# Patient Record
Sex: FEMALE | URBAN - METROPOLITAN AREA
[De-identification: names, ages, dates, MRNs, and addresses within clinical notes are randomized per-mention and may not be internally consistent; named-entity substitution may affect disease eponyms.]

---

## 2023-07-17 ENCOUNTER — APPOINTMENT (OUTPATIENT)
Dept: LAB | Facility: LAB | Age: 23
End: 2023-07-17
Payer: COMMERCIAL

## 2024-06-05 ENCOUNTER — APPOINTMENT (OUTPATIENT)
Dept: PRIMARY CARE | Facility: CLINIC | Age: 24
End: 2024-06-05
Payer: COMMERCIAL

## 2024-09-06 ENCOUNTER — APPOINTMENT (OUTPATIENT)
Dept: PRIMARY CARE | Facility: CLINIC | Age: 24
End: 2024-09-06
Payer: COMMERCIAL

## 2025-05-19 ENCOUNTER — APPOINTMENT (OUTPATIENT)
Dept: PRIMARY CARE | Facility: CLINIC | Age: 25
End: 2025-05-19

## 2025-05-21 ENCOUNTER — APPOINTMENT (OUTPATIENT)
Dept: PRIMARY CARE | Facility: CLINIC | Age: 25
End: 2025-05-21
Payer: COMMERCIAL

## 2025-05-21 ENCOUNTER — NUTRITION (OUTPATIENT)
Dept: PRIMARY CARE | Facility: CLINIC | Age: 25
End: 2025-05-21
Payer: COMMERCIAL

## 2025-05-21 VITALS
HEIGHT: 67 IN | RESPIRATION RATE: 18 BRPM | WEIGHT: 230.2 LBS | TEMPERATURE: 97 F | HEART RATE: 84 BPM | SYSTOLIC BLOOD PRESSURE: 110 MMHG | OXYGEN SATURATION: 98 % | BODY MASS INDEX: 36.13 KG/M2 | DIASTOLIC BLOOD PRESSURE: 64 MMHG

## 2025-05-21 VITALS — BODY MASS INDEX: 36.05 KG/M2 | HEIGHT: 67 IN

## 2025-05-21 DIAGNOSIS — F32.0 CURRENT MILD EPISODE OF MAJOR DEPRESSIVE DISORDER WITHOUT PRIOR EPISODE: Primary | ICD-10-CM

## 2025-05-21 DIAGNOSIS — E66.9 OBESITY (BMI 35.0-39.9 WITHOUT COMORBIDITY): Primary | ICD-10-CM

## 2025-05-21 DIAGNOSIS — Z00.00 ANNUAL PHYSICAL EXAM: ICD-10-CM

## 2025-05-21 DIAGNOSIS — F41.9 ANXIETY AND DEPRESSION: ICD-10-CM

## 2025-05-21 DIAGNOSIS — E66.9 OBESITY (BMI 35.0-39.9 WITHOUT COMORBIDITY): ICD-10-CM

## 2025-05-21 DIAGNOSIS — F32.A ANXIETY AND DEPRESSION: ICD-10-CM

## 2025-05-21 DIAGNOSIS — F41.9 ANXIETY: ICD-10-CM

## 2025-05-21 PROBLEM — N89.8 VAGINAL ODOR: Status: ACTIVE | Noted: 2025-05-21

## 2025-05-21 PROBLEM — N92.1 BREAKTHROUGH BLEEDING: Status: ACTIVE | Noted: 2025-05-21

## 2025-05-21 PROCEDURE — 99385 PREV VISIT NEW AGE 18-39: CPT | Performed by: INTERNAL MEDICINE

## 2025-05-21 PROCEDURE — 99214 OFFICE O/P EST MOD 30 MIN: CPT | Performed by: INTERNAL MEDICINE

## 2025-05-21 PROCEDURE — 97802 MEDICAL NUTRITION INDIV IN: CPT | Performed by: DIETITIAN, REGISTERED

## 2025-05-21 PROCEDURE — 3008F BODY MASS INDEX DOCD: CPT | Performed by: INTERNAL MEDICINE

## 2025-05-21 ASSESSMENT — ENCOUNTER SYMPTOMS
DIARRHEA: 1
NAUSEA: 0
WHEEZING: 0
ADENOPATHY: 0
CHEST TIGHTNESS: 0
VOMITING: 0
DYSURIA: 0
SLEEP DISTURBANCE: 0
CONSTIPATION: 1
NERVOUS/ANXIOUS: 1
UNEXPECTED WEIGHT CHANGE: 0
SHORTNESS OF BREATH: 0
CONFUSION: 0
DIZZINESS: 0
PALPITATIONS: 0
ARTHRALGIAS: 0
SORE THROAT: 0
FATIGUE: 1
CHILLS: 0
WEAKNESS: 0
JOINT SWELLING: 0
ROS GI COMMENTS: SOMETIMES
ABDOMINAL PAIN: 0
COUGH: 0

## 2025-05-21 ASSESSMENT — PATIENT HEALTH QUESTIONNAIRE - PHQ9
4. FEELING TIRED OR HAVING LITTLE ENERGY: SEVERAL DAYS
5. POOR APPETITE OR OVEREATING: SEVERAL DAYS
2. FEELING DOWN, DEPRESSED OR HOPELESS: MORE THAN HALF THE DAYS
10. IF YOU CHECKED OFF ANY PROBLEMS, HOW DIFFICULT HAVE THESE PROBLEMS MADE IT FOR YOU TO DO YOUR WORK, TAKE CARE OF THINGS AT HOME, OR GET ALONG WITH OTHER PEOPLE: NOT DIFFICULT AT ALL
8. MOVING OR SPEAKING SO SLOWLY THAT OTHER PEOPLE COULD HAVE NOTICED. OR THE OPPOSITE, BEING SO FIGETY OR RESTLESS THAT YOU HAVE BEEN MOVING AROUND A LOT MORE THAN USUAL: NOT AT ALL
7. TROUBLE CONCENTRATING ON THINGS, SUCH AS READING THE NEWSPAPER OR WATCHING TELEVISION: NOT AT ALL
6. FEELING BAD ABOUT YOURSELF - OR THAT YOU ARE A FAILURE OR HAVE LET YOURSELF OR YOUR FAMILY DOWN: MORE THAN HALF THE DAYS
SUM OF ALL RESPONSES TO PHQ QUESTIONS 1-9: 9
1. LITTLE INTEREST OR PLEASURE IN DOING THINGS: MORE THAN HALF THE DAYS
3. TROUBLE FALLING OR STAYING ASLEEP OR SLEEPING TOO MUCH: SEVERAL DAYS
SUM OF ALL RESPONSES TO PHQ9 QUESTIONS 1 AND 2: 4
9. THOUGHTS THAT YOU WOULD BE BETTER OFF DEAD, OR OF HURTING YOURSELF: NOT AT ALL

## 2025-05-21 NOTE — PROGRESS NOTES
Subjective   Brianne Merida is a 24 y.o. female who presents for Establish Care (NP -establish care ) and Annual Exam (CPE).  NP- establish care, found dr Dumont name  under  her  insurance plan, states she  did se a CPE - 3-4 years ago   CPE  Patient would like  to talk regarding weight  loss [place],did try diet, exercising ,did  follow up  with  weight  watchers  program - did not help  losing weight   PHQ2/9= 9 , working night shift, mostly have anxiety, worry about everything.      Review of Systems   Constitutional:  Positive for fatigue. Negative for chills and unexpected weight change.        Sometimes    HENT:  Negative for congestion, ear pain and sore throat.    Respiratory:  Negative for cough, chest tightness, shortness of breath and wheezing.    Cardiovascular:  Negative for palpitations and leg swelling.   Gastrointestinal:  Positive for constipation and diarrhea. Negative for abdominal pain, nausea and vomiting.        Sometimes    Genitourinary:  Negative for dysuria and urgency.   Musculoskeletal:  Negative for arthralgias and joint swelling.   Skin:  Negative for rash.   Neurological:  Negative for dizziness and weakness.   Hematological:  Negative for adenopathy.   Psychiatric/Behavioral:  Negative for confusion and sleep disturbance. The patient is nervous/anxious.    All other systems reviewed and are negative.      Objective   Physical Exam  Constitutional:       Appearance: Normal appearance.      Comments: obese   HENT:      Head: Normocephalic and atraumatic.   Eyes:      Conjunctiva/sclera: Conjunctivae normal.   Neck:      Vascular: No carotid bruit.   Cardiovascular:      Rate and Rhythm: Normal rate and regular rhythm.      Heart sounds: No murmur heard.  Pulmonary:      Effort: No respiratory distress.      Breath sounds: No wheezing, rhonchi or rales.   Chest:      Chest wall: No tenderness.   Abdominal:      General: Bowel sounds are normal. There is no distension.      Palpations: Abdomen  "is soft. There is no mass.      Tenderness: There is no abdominal tenderness.   Musculoskeletal:         General: Normal range of motion.      Cervical back: Neck supple.   Lymphadenopathy:      Cervical: No cervical adenopathy.   Skin:     Coloration: Skin is not jaundiced.      Findings: No rash.   Neurological:      General: No focal deficit present.      Mental Status: She is alert and oriented to person, place, and time. Mental status is at baseline.      Motor: No weakness.      Gait: Gait normal.   Psychiatric:         Mood and Affect: Mood normal.         Behavior: Behavior normal.         Judgment: Judgment normal.       /64 (BP Location: Left arm, Patient Position: Sitting)   Pulse 84   Temp 36.1 °C (97 °F)   Resp 18   Ht 1.702 m (5' 7\")   Wt 104 kg (230 lb 3.2 oz)   LMP 05/06/2025   SpO2 98%   BMI 36.05 kg/m²       Assessment/Plan   Problem List Items Addressed This Visit       Obesity (BMI 35.0-39.9 without comorbidity)    Relevant Orders    Referral to Nutrition Services    Insulin, Fasting    Anxiety    Current mild episode of major depressive disorder without prior episode - Primary    Relevant Orders    Cortisol    Vitamin D 25-Hydroxy,Total (for eval of Vitamin D levels)    Microscopic Only, Urine    Tsh With Reflex To Free T4 If Abnormal    Basic metabolic panel    CBC    Comprehensive metabolic panel    Folate    TSH    Vitamin B12 level    Follow Up In [facility]    Annual physical exam    Relevant Orders    Lipid Panel         "

## 2025-05-21 NOTE — PROGRESS NOTES
Nutrition Initial Assessment:     Patient Brianne Merida is a 24 y.o. female being seen at [facility] who was referred by Dr. Salazar Dumont on 5/21/25 for   1. Obesity (BMI 35.0-39.9 without comorbidity)  Referral to Nutrition Services        Nutrition Assessment    Problem List[1]    Nutrition History:  Food & Nutrition History: Patient is having this visit to discuss weight management. She has been vegetarian for 6-7 years. She primarily eats whole food sources of vegetarian protein instead of meat analogues. She said she has food noise and has experienced it for years, she has been watching her weight/diet since grade school, and said she was raised in a family that also had food noise. 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 She is interested in weight loss medications and potentially might start on one.  Food Allergies:  (none);  Food Intolerances: none  Sleep Habits:  (has disrupted sleep she attributes to working nights)    Diet Recall:  Meal 1: B: on a work day coffee w/ creamer without any food. On a non-work day coffee w/ cream and a yogurt bowl, or she skips breakfast and doesn't eat during the daytime hours (since this is normally when her body is sleeping)  Snack 1: 10 pm - cheese, crackers, fruit  Meal 2: 2-3 am she compiles healthy snacks together to make a combination lunch/dinner:  yogurt w/ fruit and mitra seeds, and a Greek salad / cowboy caviar and chips. Sometimes an energy drink. On non-workdays, she eats a Mexican bowl for dinner (not at 2-3 am)  Snack 2: B: when her shift is finished she eats around 9 am - 2 eggs and toast  Fluid Intake: coffee in the morning, 2 Liquid IV/day, sometimes an energy drink. Diet Pepsi occasionally. Social EtOH.  Energy Intake: Good > 75 % (carbs can make her beaver but they also make her feel tired)    Food Preparation:  Cooking: Patient (feels best when meal prepping)  Grocery Shopping:  Writer called patient and left message asking him to call back and reschedule his appt. On 10/19.    "Patient    Physical Activity:   Walks the dog each day, sometimes walks are longer such as 2-3 times per week for she goes for a 1-2 mile walk. She is on her feet at work.    Anthropometrics:  Height: 170.2 cm (5' 7.01\")   Weight this morning in the same office: 230#   BMI: 36.05    Weight History:   Daily Weight  05/21/25 : 104 kg (230 lb 3.2 oz)    Nutrition Focused Physical Exam Findings:  Defer    Nutrition Significant Labs:  No labs are available    Medications:  No current outpatient medications     Estimated Needs:  Total Energy Estimated Needs in 24 hours (kCal): 1691 kCal;    Total Protein Estimated Needs in 24 Hours (g): 84 g;     ;    Nutrition Diagnosis   Malnutrition Diagnosis  Patient has Malnutrition Diagnosis: No    Nutrition Diagnosis  Patient has Nutrition Diagnosis: Yes  Diagnosis Status (1): New  Nutrition Diagnosis 1: Unintended weight gain  Related to (1): multifactorial such as working nights, eating inconsistently  As Evidenced by (1): patient reports her weight was 140 pounds during  the pandemic = 90 pound / 64% weight gain in about 5 years       Nutrition Interventions/Recommendations   Nutrition Prescription: Oral nutrition Reduced calorie diet for weight loss, vegetarian food    Nutrition Interventions:   Food and Nutrient Delivery: Meals & Snacks: Energy-modified diet, Modification of schedule of oral intake  Goals: 1. She will consistently plan and prepare foods so that she has meals available on workdays and non-workdays. 2. She will use a nutrition tracker on her phone, targeting about 0739-3463 calories/day and about  grams protein/day     Nutrition Education:   Nutrition Education Content: Content related nutrition education  calorie reduction, adequate protein    Educational Handouts Provided: VPG protein sources, hand-written notes about calorie/protein needs, ACLM cookbook, high protein breakfast ideas, and Chat-GPT generated meals of 600 calories or less and 30 g protein " that are vegetarian      Readiness to Change : Good  Level of Understanding : Good  Anticipated Compliant : Good         Nutrition Monitoring and Evaluation   Food and Nutrient Intake  Monitoring and Evaluation Plan: Energy intake, Protein intake  Energy Intake: Estimated energy intake  Criteria: 1800/day  Estimated protein intake: Estimated protein intake  Criteria: /day         Anthropometric measurements  Monitoring and Evaluation Plan: Weight                     Follow Up: PRN she will reach out for more support as needed    Time Spent  Prep time on day of patient encounter: 5 minutes  Time spent directly with patient, family or caregiver: 55 minutes  Additional Time Spent on Patient Care Activities: 0 minutes  Documentation Time: 10 minutes  Other Time Spent: 0 minutes  Total: 70 minutes             [1]   Patient Active Problem List  Diagnosis    Breakthrough bleeding    Chicken pox    Vaginal odor    Obesity (BMI 35.0-39.9 without comorbidity)    Anxiety    Current mild episode of major depressive disorder without prior episode    Annual physical exam

## 2025-05-21 NOTE — PATIENT INSTRUCTIONS
Reviewed her diet and discussed how it compares to her estimated calorie and protein needs. She likely is eating within her recommended calorie level and may be eating enough or protein or some days is not meeting protein needs. Provided suggestions for meal ideas that can help her meet these needs. Suggested she focus on eating first food of the day within 2-3 hours of being awake and aim to eat food again within 5 hours of the last meal so that she isn't going prolonged periods of time without eating.   Discussed that if she does start on a weight loss medication in the future, eating at regular intervals and meeting protein needs with be even more important.

## 2025-05-22 ENCOUNTER — TELEPHONE (OUTPATIENT)
Dept: PRIMARY CARE | Facility: CLINIC | Age: 25
End: 2025-05-22
Payer: COMMERCIAL

## 2025-05-28 RX ORDER — ACETAMINOPHEN 500 MG
TABLET ORAL DAILY
COMMUNITY

## 2025-06-09 ENCOUNTER — TELEPHONE (OUTPATIENT)
Dept: PRIMARY CARE | Facility: CLINIC | Age: 25
End: 2025-06-09
Payer: COMMERCIAL

## 2025-06-09 NOTE — PROGRESS NOTES
Outreach #2: Writer attempted to outreach pt regarding their referral to Collaborative Care. LVM. Without response, pt's referral to Collaborative Care may be closed.

## 2025-06-19 ENCOUNTER — TELEPHONE (OUTPATIENT)
Dept: PRIMARY CARE | Facility: CLINIC | Age: 25
End: 2025-06-19

## 2025-06-19 ENCOUNTER — OFFICE VISIT (OUTPATIENT)
Dept: PRIMARY CARE | Facility: CLINIC | Age: 25
End: 2025-06-19
Payer: COMMERCIAL

## 2025-06-19 VITALS
BODY MASS INDEX: 35.82 KG/M2 | RESPIRATION RATE: 16 BRPM | WEIGHT: 228.2 LBS | TEMPERATURE: 97.3 F | OXYGEN SATURATION: 98 % | SYSTOLIC BLOOD PRESSURE: 120 MMHG | HEIGHT: 67 IN | DIASTOLIC BLOOD PRESSURE: 66 MMHG

## 2025-06-19 DIAGNOSIS — E66.9 OBESITY (BMI 35.0-39.9 WITHOUT COMORBIDITY): Primary | ICD-10-CM

## 2025-06-19 DIAGNOSIS — R79.89 ABNORMAL CORTISOL LEVEL: ICD-10-CM

## 2025-06-19 DIAGNOSIS — E66.9 OBESITY (BMI 35.0-39.9 WITHOUT COMORBIDITY): ICD-10-CM

## 2025-06-19 DIAGNOSIS — F32.0 CURRENT MILD EPISODE OF MAJOR DEPRESSIVE DISORDER WITHOUT PRIOR EPISODE: ICD-10-CM

## 2025-06-19 DIAGNOSIS — E55.9 VITAMIN D DEFICIENCY: ICD-10-CM

## 2025-06-19 PROCEDURE — 99214 OFFICE O/P EST MOD 30 MIN: CPT | Performed by: INTERNAL MEDICINE

## 2025-06-19 PROCEDURE — 3008F BODY MASS INDEX DOCD: CPT | Performed by: INTERNAL MEDICINE

## 2025-06-19 RX ORDER — ERGOCALCIFEROL 1.25 MG/1
1.25 CAPSULE ORAL WEEKLY
Qty: 6 CAPSULE | Refills: 0 | Status: SHIPPED | OUTPATIENT
Start: 2025-06-19 | End: 2025-07-31

## 2025-06-19 RX ORDER — TIRZEPATIDE 2.5 MG/.5ML
2.5 INJECTION, SOLUTION SUBCUTANEOUS
Qty: 3 ML | Refills: 3 | Status: SHIPPED | OUTPATIENT
Start: 2025-06-22

## 2025-06-19 ASSESSMENT — ENCOUNTER SYMPTOMS
UNEXPECTED WEIGHT CHANGE: 0
PALPITATIONS: 0
ABDOMINAL PAIN: 0
SORE THROAT: 0
FATIGUE: 0
ADENOPATHY: 0
VOMITING: 0
ARTHRALGIAS: 0
NAUSEA: 0
SHORTNESS OF BREATH: 0
DIZZINESS: 0
COUGH: 0
WHEEZING: 0
DIARRHEA: 0
SLEEP DISTURBANCE: 0
WEAKNESS: 0
CONFUSION: 0
DYSURIA: 0
CHILLS: 0
JOINT SWELLING: 0
CHEST TIGHTNESS: 0
CONSTIPATION: 0

## 2025-06-19 ASSESSMENT — PATIENT HEALTH QUESTIONNAIRE - PHQ9
SUM OF ALL RESPONSES TO PHQ9 QUESTIONS 1 AND 2: 2
1. LITTLE INTEREST OR PLEASURE IN DOING THINGS: SEVERAL DAYS
2. FEELING DOWN, DEPRESSED OR HOPELESS: SEVERAL DAYS

## 2025-06-19 NOTE — PROGRESS NOTES
"Subjective   Brianne Merida is a 24 y.o. female who presents for Follow-up (1 month follow up/Labs 5.2025).  1 month follow up depression on/off not on [place] , had labs done, was seen by nutritionist for wt loss diet, not seen by behavioral health yet.  Labs - 5.2025      Review of Systems   Constitutional:  Negative for chills, fatigue and unexpected weight change.        Comment   HENT:  Negative for congestion, ear pain and sore throat.    Respiratory:  Negative for cough, chest tightness, shortness of breath and wheezing.    Cardiovascular:  Negative for palpitations and leg swelling.   Gastrointestinal:  Negative for abdominal pain, constipation, diarrhea, nausea and vomiting.   Genitourinary:  Negative for dysuria and urgency.   Musculoskeletal:  Negative for arthralgias and joint swelling.   Skin:  Negative for rash.   Neurological:  Negative for dizziness and weakness.   Hematological:  Negative for adenopathy.   Psychiatric/Behavioral:  Negative for confusion and sleep disturbance.        Objective   Physical Exam  Constitutional:       Appearance: Normal appearance.   HENT:      Head: Normocephalic and atraumatic.   Eyes:      Pupils: Pupils are equal, round, and reactive to light.   Cardiovascular:      Rate and Rhythm: Normal rate and regular rhythm.   Pulmonary:      Effort: Pulmonary effort is normal.      Breath sounds: Normal breath sounds.   Musculoskeletal:         General: Normal range of motion.      Cervical back: Normal range of motion and neck supple.   Skin:     General: Skin is warm.   Neurological:      General: No focal deficit present.      Mental Status: She is alert and oriented to person, place, and time.   Psychiatric:         Mood and Affect: Mood normal.         Behavior: Behavior normal.       /66 (BP Location: Left arm, Patient Position: Sitting)   Temp 36.3 °C (97.3 °F)   Resp 16   Ht 1.702 m (5' 7.01\")   Wt 104 kg (228 lb 3.2 oz)   LMP 05/06/2025 Comment: patient had  a " inhome prenancy  test  last  night, states was negative  SpO2 98%   BMI 35.73 kg/m²       Assessment/Plan   Problem List Items Addressed This Visit       Obesity (BMI 35.0-39.9 without comorbidity) - Primary    Seen by nutritionist , will order glp1.  No Fhx of thyroid cancer.           Relevant Medications    tirzepatide, weight loss, (Zepbound) 2.5 mg/0.5 mL injection    Other Relevant Orders    Insulin, Fasting    Current mild episode of major depressive disorder without prior episode    Not on medication, to see behavior health         Abnormal cortisol level    Relevant Orders    Cortisol    Lipid Panel    Vitamin D deficiency    Relevant Medications    ergocalciferol (Vitamin D-2) 1250 mcg (50,000 units) capsule

## 2025-06-23 ENCOUNTER — APPOINTMENT (OUTPATIENT)
Dept: PRIMARY CARE | Facility: CLINIC | Age: 25
End: 2025-06-23
Payer: COMMERCIAL

## 2025-07-17 ENCOUNTER — HOSPITAL ENCOUNTER (OUTPATIENT)
Age: 25
Discharge: HOME | End: 2025-07-17
Payer: COMMERCIAL

## 2025-07-17 DIAGNOSIS — N91.2: Primary | ICD-10-CM

## 2025-07-17 LAB — HCG SERPL QL: < 1 MIU/ML

## 2025-07-17 PROCEDURE — 36415 COLL VENOUS BLD VENIPUNCTURE: CPT

## 2025-07-17 PROCEDURE — 84702 CHORIONIC GONADOTROPIN TEST: CPT

## 2025-08-01 DIAGNOSIS — E55.9 VITAMIN D DEFICIENCY: ICD-10-CM

## 2025-08-01 RX ORDER — ERGOCALCIFEROL 1.25 MG/1
1.25 CAPSULE ORAL
Qty: 6 CAPSULE | Refills: 0 | Status: SHIPPED | OUTPATIENT
Start: 2025-08-01

## 2025-08-20 ENCOUNTER — TELEPHONE (OUTPATIENT)
Dept: PRIMARY CARE | Facility: CLINIC | Age: 25
End: 2025-08-20
Payer: COMMERCIAL

## 2025-08-20 DIAGNOSIS — E66.9 OBESITY (BMI 35.0-39.9 WITHOUT COMORBIDITY): ICD-10-CM

## 2025-08-20 RX ORDER — TIRZEPATIDE 2.5 MG/.5ML
2.5 INJECTION, SOLUTION SUBCUTANEOUS
Qty: 3 ML | Refills: 3 | Status: SHIPPED | OUTPATIENT
Start: 2025-08-24

## 2025-09-29 ENCOUNTER — APPOINTMENT (OUTPATIENT)
Dept: PRIMARY CARE | Facility: CLINIC | Age: 25
End: 2025-09-29
Payer: COMMERCIAL